# Patient Record
Sex: MALE | Race: BLACK OR AFRICAN AMERICAN
[De-identification: names, ages, dates, MRNs, and addresses within clinical notes are randomized per-mention and may not be internally consistent; named-entity substitution may affect disease eponyms.]

---

## 2021-09-22 ENCOUNTER — HOSPITAL ENCOUNTER (INPATIENT)
Dept: HOSPITAL 96 - M.ERS | Age: 56
LOS: 6 days | Discharge: HOME | DRG: 177 | End: 2021-09-28
Attending: STUDENT IN AN ORGANIZED HEALTH CARE EDUCATION/TRAINING PROGRAM | Admitting: STUDENT IN AN ORGANIZED HEALTH CARE EDUCATION/TRAINING PROGRAM
Payer: OTHER GOVERNMENT

## 2021-09-22 VITALS — WEIGHT: 315 LBS | BODY MASS INDEX: 42.66 KG/M2 | HEIGHT: 72.01 IN

## 2021-09-22 VITALS — DIASTOLIC BLOOD PRESSURE: 63 MMHG | SYSTOLIC BLOOD PRESSURE: 103 MMHG

## 2021-09-22 DIAGNOSIS — I26.99: ICD-10-CM

## 2021-09-22 DIAGNOSIS — J12.82: ICD-10-CM

## 2021-09-22 DIAGNOSIS — Z79.82: ICD-10-CM

## 2021-09-22 DIAGNOSIS — E78.5: ICD-10-CM

## 2021-09-22 DIAGNOSIS — Z88.8: ICD-10-CM

## 2021-09-22 DIAGNOSIS — E11.65: ICD-10-CM

## 2021-09-22 DIAGNOSIS — I21.A1: ICD-10-CM

## 2021-09-22 DIAGNOSIS — J96.01: ICD-10-CM

## 2021-09-22 DIAGNOSIS — I10: ICD-10-CM
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
## 2021-09-22 DIAGNOSIS — N30.00: ICD-10-CM

## 2021-09-22 DIAGNOSIS — U07.1: Primary | ICD-10-CM

## 2021-09-22 DIAGNOSIS — T38.0X5A: ICD-10-CM

## 2021-09-22 DIAGNOSIS — Z79.899: ICD-10-CM

## 2021-09-22 DIAGNOSIS — E66.01: ICD-10-CM

## 2021-09-22 LAB
ABSOLUTE BASOPHILS: 0.1 THOU/UL (ref 0–0.2)
ABSOLUTE EOSINOPHILS: 0.1 THOU/UL (ref 0–0.7)
ABSOLUTE MONOCYTES: 0.8 THOU/UL (ref 0–1.2)
ALBUMIN SERPL-MCNC: 3 G/DL (ref 3.4–5)
ALP SERPL-CCNC: 96 U/L (ref 46–116)
ALT SERPL-CCNC: 119 U/L (ref 30–65)
ANION GAP SERPL CALC-SCNC: 5 MMOL/L (ref 7–16)
AST SERPL-CCNC: 53 U/L (ref 15–37)
BASOPHILS NFR BLD AUTO: 0.9 %
BE: 1.2 MMOL/L
BILIRUB SERPL-MCNC: 0.6 MG/DL
BUN SERPL-MCNC: 10 MG/DL (ref 7–18)
CALCIUM SERPL-MCNC: 9 MG/DL (ref 8.5–10.1)
CHLORIDE SERPL-SCNC: 102 MMOL/L (ref 98–107)
CO2 SERPL-SCNC: 31 MMOL/L (ref 21–32)
CREAT SERPL-MCNC: 1.3 MG/DL (ref 0.6–1.3)
EOSINOPHIL NFR BLD: 0.6 %
GLUCOSE SERPL-MCNC: 156 MG/DL (ref 70–99)
GRANULOCYTES NFR BLD MANUAL: 75 %
HCT VFR BLD CALC: 41.7 % (ref 42–52)
HGB BLD-MCNC: 13.7 GM/DL (ref 14–18)
LYMPHOCYTES # BLD: 1 THOU/UL (ref 0.8–5.3)
LYMPHOCYTES NFR BLD AUTO: 12.6 %
MAGNESIUM SERPL-MCNC: 2.2 MG/DL (ref 1.8–2.4)
MCH RBC QN AUTO: 27.4 PG (ref 26–34)
MCHC RBC AUTO-ENTMCNC: 33 G/DL (ref 28–37)
MCV RBC: 83 FL (ref 80–100)
MONOCYTES NFR BLD: 10.9 %
MPV: 7.4 FL. (ref 7.2–11.1)
NEUTROPHILS # BLD: 5.8 THOU/UL (ref 1.6–8.1)
NT-PRO BRAIN NAT PEPTIDE: 470 PG/ML (ref ?–300)
NUCLEATED RBCS: 0 /100WBC
PCO2 BLD: 41.2 MMHG (ref 35–45)
PLATELET COUNT*: 328 THOU/UL (ref 150–400)
PO2 BLD: 141.5 MMHG (ref 75–100)
POTASSIUM SERPL-SCNC: 4.5 MMOL/L (ref 3.5–5.1)
PROT SERPL-MCNC: 7.9 G/DL (ref 6.4–8.2)
RBC # BLD AUTO: 5.02 MIL/UL (ref 4.5–6)
RDW-CV: 14.8 % (ref 10.5–14.5)
SODIUM SERPL-SCNC: 138 MMOL/L (ref 136–145)
WBC # BLD AUTO: 7.7 THOU/UL (ref 4–11)

## 2021-09-23 VITALS — SYSTOLIC BLOOD PRESSURE: 121 MMHG | DIASTOLIC BLOOD PRESSURE: 93 MMHG

## 2021-09-23 VITALS — SYSTOLIC BLOOD PRESSURE: 108 MMHG | DIASTOLIC BLOOD PRESSURE: 75 MMHG

## 2021-09-23 VITALS — SYSTOLIC BLOOD PRESSURE: 119 MMHG | DIASTOLIC BLOOD PRESSURE: 78 MMHG

## 2021-09-23 VITALS — SYSTOLIC BLOOD PRESSURE: 91 MMHG | DIASTOLIC BLOOD PRESSURE: 64 MMHG

## 2021-09-23 VITALS — SYSTOLIC BLOOD PRESSURE: 114 MMHG | DIASTOLIC BLOOD PRESSURE: 81 MMHG

## 2021-09-23 VITALS — SYSTOLIC BLOOD PRESSURE: 113 MMHG | DIASTOLIC BLOOD PRESSURE: 62 MMHG

## 2021-09-23 LAB
BACTERIA-REFLEX: (no result) /HPF
BILIRUB UR-MCNC: NEGATIVE MG/DL
COLOR UR: YELLOW
KETONES UR STRIP-MCNC: NEGATIVE MG/DL
MUCUS: (no result) STRN/LPF
PROT UR QL STRIP: NEGATIVE
RBC # UR STRIP: NEGATIVE /UL
RBC #/AREA URNS HPF: (no result) /HPF (ref 0–2)
SP GR UR STRIP: 1.01 (ref 1–1.03)
SQUAMOUS: (no result) /LPF (ref 0–3)
TRANSITIONAL EPITHEL CELL: (no result) /LPF
URINE CLARITY: (no result)
URINE GLUCOSE-RANDOM: NEGATIVE
URINE LEUKOCYTES-REFLEX: (no result)
URINE NITRITE-REFLEX: NEGATIVE
UROBILINOGEN UR STRIP-ACNC: 0.2 E.U./DL (ref 0.2–1)

## 2021-09-23 NOTE — 2DMMODE
[place] [address]
Phone:  [phone] 2 D/M-MODE ECHOCARDIOGRAM     
_______________________________________________________________________________
 
Name:         RODOLFO WYATT                  Room:          [address] [place] 
[facility]#:    [id number]     Account #:     [id number]  
Admission:    21    Attend Phys:   Veronika Huitron
Discharge:                Date of Birth: [birth year]  
Date of Service: 21 1231  Report #:      [id number]
        [id number]
_______________________________________________________________________________
THIS REPORT FOR:
 
cc:  FAM - No family physician/PCP 
     FAM - No family physician/PCP 
     Best Howard MD [facility]     
                                                                       ~
 
--------------- APPROVED REPORT --------------
 
 
Study performed:  2021 10:11:45
 
EXAM: Comprehensive 2D, Doppler, and color-flow 
Echocardiogram 
Patient Location: In-Patient   
Room #:  [facility]     Status:  routine
 
     BSA:         3.13
HR: 96 bpm BP:          91/64 mmHg 
Rhythm: NSR    
 
Other Information 
Study Quality: Good
 
Indications
Elevated Troponin
 
2D Dimensions
IVSd:  11.42 (7-11mm) LVOT Diam:  21.66 (18-24mm) 
LVDd:  37.63 mm  
PWd:  11.74 (7-11mm) Ascending Ao:  38.53 (22-36mm)
LVDs:  21.46 (25-40mm) 
Aortic Root:  38.22 mm 
 
Volumes
Left Atrial Volume (Systole) 
    LA ESV Index:  16.50 mL/m2
 
Aortic Valve
AoV Peak Xavier.:  1.33 m/s 
AO Peak Gr.:  7.10 mmHg  LVOT Max PG:  3.01 mmHg
AO Mean Gr.:  3.80 mmHg  LVOT Mean P.57 mmHg
    LVOT Max V:  0.87 m/s
AO V2 VTI:  18.74 cm  LVOT Mean V:  0.59 m/s
SARAH (VTI):  2.66 cm2  LVOT V1 VTI:  13.53 cm
 
 
 
[place] [address]
Phone:  [phone]                     2 D/M-MODE ECHOCARDIOGRAM     
_______________________________________________________________________________
 
Name:         RODOLFO WYATT                  Room:          [address] [place] 
.R.#:    [id number]     Account #:     [id number]  
Admission:    21    Attend Phys:   Veronika Huitron
Discharge:                Date of Birth: [birth year]  
Date of Service: 21 1231  Report #:      [id number]
        [id number]
_______________________________________________________________________________
Mitral Valve
    E/A Ratio:  1.03
    MV Decel. Time:  232.06 ms
MV E Max Xavier.:  0.58 m/s 
MV PHT:  67.30 ms  
MVA (PHT):  3.27 cm2  
 
TDI
E/Lateral E':  6.44 E/Medial E':  5.27
   Medial E' Xavier.:  0.11 m/s
   Lateral E' Xavier.:  0.09 m/s
 
Pulmonary Valve
PV Peak Xavier.:  1.23 m/s PV Peak Gr.:  6.04 mmHg
 
Tricuspid Valve
    RAP Estimate:  5.00 mmHg
TR Peak Gr.:  40.70 mmHg RVSP:  45.00 mmHg
    PA Pressure:  45.00 mmHg
 
Left Ventricle
The left ventricle is normal size. There is normal LV segmental wall 
motion. Mild concentric left ventricular hypertrophy. Left 
ventricular systolic function is normal. LVEF is 60-65%. The left 
ventricular diastolic function is normal.
 
Right Ventricle
Right ventricle is moderately dilated. The right ventricular systolic 
function is normal.
 
Atria
The left atrium size is normal. Right atrium is moderately 
dilated.
 
Aortic Valve
The aortic valve is normal in structure. No aortic regurgitation is 
present. There is no aortic valvular stenosis.
 
Mitral Valve
The mitral valve is normal in structure. There is no mitral valve 
regurgitation noted. No evidence of mitral valve stenosis.
 
Tricuspid Valve
The tricuspid valve is normal in structure. Mild tricuspid 
regurgitation. Moderate pulmonary hypertension. The RVSP is 45-50 
mmHg.
 
 
[place] [address]
Phone:  [phone]                     2 D/M-MODE ECHOCARDIOGRAM     
_______________________________________________________________________________
 
Name:         RODOLFO WYATT                  Room:          [address] [place] 
[facility]#:    [id number]     Account #:     [id number]  
Admission:    21    Attend Phys:   Veronika Huitron
Discharge:                Date of Birth: [birth year]  
Date of Service: 21 1231  Report #:      [id number]
        [id number]
_______________________________________________________________________________
 
Pulmonic Valve
The pulmonary valve is normal in structure. There is no pulmonic 
valvular regurgitation.
 
Great Vessels
The aortic root is normal in size. IVC is normal in size and 
collapses >50% with inspiration.
 
Pericardium
There is no pericardial effusion.
 
<Conclusion>
The left ventricle is normal size.
Mild concentric left ventricular hypertrophy.
Left ventricular systolic function is normal.
LVEF is 60-65%.
The left ventricular diastolic function is normal.
There is normal LV segmental wall motion.
Right ventricle is moderately dilated.
Right atrium is moderately dilated.
Mild tricuspid regurgitation.
Moderate pulmonary hypertension.
The RVSP is 45-50 mmHg.
 
 
 
 
 
 
 
 
 
 
 
 
 
 
 
 
 
 
 
 
  <ELECTRONICALLY SIGNED>
                                           By: Best Howard MD, FACC   
  21     1231
D: 21 1231   _____________________________________
T: 21 1231   Best Howard MD, FACC     /INF

## 2021-09-23 NOTE — EKG
[place] [address]
Phone:  [phone]                     ELECTROCARDIOGRAM REPORT      
_______________________________________________________________________________
 
Name:         TRICIA WYATTNEY                  Room:          [address]    ADM [place] 
..#:    [id number]     Account #:     [id number]  
Admission:    21    Attend Phys:   Veronika Huitron
Discharge:                Date of Birth: [birth year]  
Date of Service: 21  Report #:      [id number]
        [id number]
_______________________________________________________________________________
THIS REPORT FOR:  //name//                      
 
                         [facility] ED
                                       
Test Date:    2021               Test Time:    21:10:59
Pat Name:     RODOLFO WYATT             Department:   
Patient ID:   [id number]            Room:         [facility]
Gender:       M                        Technician:   [place]
:          1965               Requested By: Delfina Campos
Order Number: [id number] MD:   Shahriar Dupont
                                 Measurements
Intervals                              Axis          
Rate:         113                      P:            64
KY:           179                      QRS:          58
QRSD:         101                      T:            4
QT:           354                                    
QTc:          486                                    
                           Interpretive Statements
Sinus tachycardia
Low voltage, precordial leads
Borderline T wave abnormalities
Borderline prolonged QT interval
No previous ECG available for comparison
Electronically Signed On 2021 12:38:26 CDT by Shahriar Dupont
[email or web address]
 
 
 
 
 
 
 
 
 
 
 
 
 
 
 
 
 
 
 
  <ELECTRONICALLY SIGNED>
                                           By: Shahriar Dupont MD, [facility]     
  21     1238
D: 21   _____________________________________
T: 21   Shahriar Dupont MD, [facility]       /EPI

## 2021-09-24 VITALS — DIASTOLIC BLOOD PRESSURE: 59 MMHG | SYSTOLIC BLOOD PRESSURE: 101 MMHG

## 2021-09-24 VITALS — SYSTOLIC BLOOD PRESSURE: 104 MMHG | DIASTOLIC BLOOD PRESSURE: 70 MMHG

## 2021-09-24 VITALS — SYSTOLIC BLOOD PRESSURE: 109 MMHG | DIASTOLIC BLOOD PRESSURE: 73 MMHG

## 2021-09-24 VITALS — DIASTOLIC BLOOD PRESSURE: 81 MMHG | SYSTOLIC BLOOD PRESSURE: 114 MMHG

## 2021-09-24 VITALS — DIASTOLIC BLOOD PRESSURE: 64 MMHG | SYSTOLIC BLOOD PRESSURE: 104 MMHG

## 2021-09-24 LAB
ANION GAP SERPL CALC-SCNC: 8 MMOL/L (ref 7–16)
BUN SERPL-MCNC: 16 MG/DL (ref 7–18)
CALCIUM SERPL-MCNC: 8.8 MG/DL (ref 8.5–10.1)
CHLORIDE SERPL-SCNC: 105 MMOL/L (ref 98–107)
CO2 SERPL-SCNC: 27 MMOL/L (ref 21–32)
CREAT SERPL-MCNC: 1.4 MG/DL (ref 0.6–1.3)
EST. AVERAGE GLUCOSE BLD GHB EST-MCNC: 157 MG/DL
GLUCOSE SERPL-MCNC: 142 MG/DL (ref 70–99)
GLYCOHEMOGLOBIN (HGB A1C): 7.1 % (ref 4.8–5.6)
HCT VFR BLD CALC: 39.8 % (ref 42–52)
HGB BLD-MCNC: 12.8 GM/DL (ref 14–18)
MCH RBC QN AUTO: 26.7 PG (ref 26–34)
MCHC RBC AUTO-ENTMCNC: 32.1 G/DL (ref 28–37)
MCV RBC: 83.3 FL (ref 80–100)
MPV: 7.3 FL. (ref 7.2–11.1)
PLATELET COUNT*: 317 THOU/UL (ref 150–400)
POTASSIUM SERPL-SCNC: 4.8 MMOL/L (ref 3.5–5.1)
RBC # BLD AUTO: 4.78 MIL/UL (ref 4.5–6)
RDW-CV: 15.1 % (ref 10.5–14.5)
SODIUM SERPL-SCNC: 140 MMOL/L (ref 136–145)
WBC # BLD AUTO: 10.3 THOU/UL (ref 4–11)

## 2021-09-24 NOTE — EKG
[place] [address]
Phone:  [phone]                     ELECTROCARDIOGRAM REPORT      
_______________________________________________________________________________
 
Name:         TRICIA WYATTNEY                  Room:          [address]    [place] 
..#:    [id number]     Account #:     [id number]  
Admission:    21    Attend Phys:   Veronika Huitron
Discharge:                Date of Birth: [birth year]  
Date of Service: 21 1235  Report #:      [id number]
        [id number]
_______________________________________________________________________________
THIS REPORT FOR:  //name//                      
 
                          [facility]
                                       
Test Date:    2021               Test Time:    12:35:49
Pat Name:     RODOLFO WYATT             Department:   
Patient ID:   [id number]            Room:         [address]
Gender:       M                        Technician:   ALFONSO
:          1965               Requested By: Robyn Mattson
Order Number: [id number]    Ritu MD:   Shahriar Dupont
                                 Measurements
Intervals                              Axis          
Rate:         93                       P:            69
WV:           180                      QRS:          52
QRSD:         90                       T:            31
QT:           366                                    
QTc:          456                                    
                           Interpretive Statements
Sinus rhythm
Anteroseptal infarct, age indeterminate possible
Compared to ECG 2021 21:10:59
Myocardial infarct finding now present
Sinus tachycardia no longer present
T-wave abnormality no longer present
Electronically Signed On 2021 9:34:52 CDT by Shahriar Dupont
[email or web address]
 
 
 
 
 
 
 
 
 
 
 
 
 
 
 
 
 
 
  <ELECTRONICALLY SIGNED>
                                           By: Shahriar Dupont MD, FAC     
  21     0934
D: 21 1235   _____________________________________
T: 21 1235   Shahriar Dupont MD, FAC       /EPI

## 2021-09-25 VITALS — SYSTOLIC BLOOD PRESSURE: 113 MMHG | DIASTOLIC BLOOD PRESSURE: 68 MMHG

## 2021-09-25 VITALS — DIASTOLIC BLOOD PRESSURE: 69 MMHG | SYSTOLIC BLOOD PRESSURE: 102 MMHG

## 2021-09-25 VITALS — DIASTOLIC BLOOD PRESSURE: 62 MMHG | SYSTOLIC BLOOD PRESSURE: 101 MMHG

## 2021-09-25 VITALS — SYSTOLIC BLOOD PRESSURE: 95 MMHG | DIASTOLIC BLOOD PRESSURE: 65 MMHG

## 2021-09-25 VITALS — SYSTOLIC BLOOD PRESSURE: 90 MMHG | DIASTOLIC BLOOD PRESSURE: 49 MMHG

## 2021-09-25 VITALS — SYSTOLIC BLOOD PRESSURE: 107 MMHG | DIASTOLIC BLOOD PRESSURE: 63 MMHG

## 2021-09-25 LAB
ANION GAP SERPL CALC-SCNC: 6 MMOL/L (ref 7–16)
BE: 3.2 MMOL/L
BUN SERPL-MCNC: 17 MG/DL (ref 7–18)
CALCIUM SERPL-MCNC: 8.5 MG/DL (ref 8.5–10.1)
CHLORIDE SERPL-SCNC: 104 MMOL/L (ref 98–107)
CO2 SERPL-SCNC: 30 MMOL/L (ref 21–32)
CREAT SERPL-MCNC: 1.3 MG/DL (ref 0.6–1.3)
GLUCOSE SERPL-MCNC: 137 MG/DL (ref 70–99)
HCT VFR BLD CALC: 37.6 % (ref 42–52)
HGB BLD-MCNC: 12.2 GM/DL (ref 14–18)
MCH RBC QN AUTO: 27.3 PG (ref 26–34)
MCHC RBC AUTO-ENTMCNC: 32.4 G/DL (ref 28–37)
MCV RBC: 84.1 FL (ref 80–100)
MPV: 7.4 FL. (ref 7.2–11.1)
PCO2 BLD: 45.6 MMHG (ref 35–45)
PLATELET COUNT*: 278 THOU/UL (ref 150–400)
PO2 BLD: 98.8 MMHG (ref 75–100)
POTASSIUM SERPL-SCNC: 4.8 MMOL/L (ref 3.5–5.1)
RBC # BLD AUTO: 4.47 MIL/UL (ref 4.5–6)
RDW-CV: 15.3 % (ref 10.5–14.5)
SODIUM SERPL-SCNC: 140 MMOL/L (ref 136–145)
WBC # BLD AUTO: 9.1 THOU/UL (ref 4–11)

## 2021-09-26 VITALS — DIASTOLIC BLOOD PRESSURE: 73 MMHG | SYSTOLIC BLOOD PRESSURE: 131 MMHG

## 2021-09-26 VITALS — DIASTOLIC BLOOD PRESSURE: 73 MMHG | SYSTOLIC BLOOD PRESSURE: 114 MMHG

## 2021-09-26 VITALS — SYSTOLIC BLOOD PRESSURE: 120 MMHG | DIASTOLIC BLOOD PRESSURE: 54 MMHG

## 2021-09-26 VITALS — SYSTOLIC BLOOD PRESSURE: 113 MMHG | DIASTOLIC BLOOD PRESSURE: 70 MMHG

## 2021-09-26 VITALS — SYSTOLIC BLOOD PRESSURE: 108 MMHG | DIASTOLIC BLOOD PRESSURE: 57 MMHG

## 2021-09-26 VITALS — DIASTOLIC BLOOD PRESSURE: 76 MMHG | SYSTOLIC BLOOD PRESSURE: 105 MMHG

## 2021-09-26 LAB
ALBUMIN SERPL-MCNC: 2.5 G/DL (ref 3.4–5)
ALP SERPL-CCNC: 74 U/L (ref 46–116)
ALT SERPL-CCNC: 111 U/L (ref 30–65)
ANION GAP SERPL CALC-SCNC: 6 MMOL/L (ref 7–16)
AST SERPL-CCNC: 33 U/L (ref 15–37)
BILIRUB SERPL-MCNC: 0.3 MG/DL
BUN SERPL-MCNC: 17 MG/DL (ref 7–18)
CALCIUM SERPL-MCNC: 8.3 MG/DL (ref 8.5–10.1)
CHLORIDE SERPL-SCNC: 102 MMOL/L (ref 98–107)
CO2 SERPL-SCNC: 30 MMOL/L (ref 21–32)
CREAT SERPL-MCNC: 1.1 MG/DL (ref 0.6–1.3)
GLUCOSE SERPL-MCNC: 149 MG/DL (ref 70–99)
HCT VFR BLD CALC: 39.8 % (ref 42–52)
HGB BLD-MCNC: 12.8 GM/DL (ref 14–18)
MAGNESIUM SERPL-MCNC: 2 MG/DL (ref 1.8–2.4)
MCH RBC QN AUTO: 26.8 PG (ref 26–34)
MCHC RBC AUTO-ENTMCNC: 32 G/DL (ref 28–37)
MCV RBC: 83.8 FL (ref 80–100)
MPV: 7.3 FL. (ref 7.2–11.1)
PLATELET COUNT*: 273 THOU/UL (ref 150–400)
POTASSIUM SERPL-SCNC: 4.4 MMOL/L (ref 3.5–5.1)
PROT SERPL-MCNC: 6.7 G/DL (ref 6.4–8.2)
RBC # BLD AUTO: 4.75 MIL/UL (ref 4.5–6)
RDW-CV: 15.1 % (ref 10.5–14.5)
SODIUM SERPL-SCNC: 138 MMOL/L (ref 136–145)
WBC # BLD AUTO: 8.5 THOU/UL (ref 4–11)

## 2021-09-27 VITALS — DIASTOLIC BLOOD PRESSURE: 77 MMHG | SYSTOLIC BLOOD PRESSURE: 142 MMHG

## 2021-09-27 VITALS — SYSTOLIC BLOOD PRESSURE: 117 MMHG | DIASTOLIC BLOOD PRESSURE: 51 MMHG

## 2021-09-27 VITALS — SYSTOLIC BLOOD PRESSURE: 112 MMHG | DIASTOLIC BLOOD PRESSURE: 64 MMHG

## 2021-09-27 VITALS — SYSTOLIC BLOOD PRESSURE: 114 MMHG | DIASTOLIC BLOOD PRESSURE: 68 MMHG

## 2021-09-27 VITALS — SYSTOLIC BLOOD PRESSURE: 120 MMHG | DIASTOLIC BLOOD PRESSURE: 75 MMHG

## 2021-09-27 VITALS — SYSTOLIC BLOOD PRESSURE: 106 MMHG | DIASTOLIC BLOOD PRESSURE: 62 MMHG

## 2021-09-27 LAB
ALBUMIN SERPL-MCNC: 2.4 G/DL (ref 3.4–5)
ALP SERPL-CCNC: 72 U/L (ref 46–116)
ALT SERPL-CCNC: 123 U/L (ref 30–65)
ANION GAP SERPL CALC-SCNC: 4 MMOL/L (ref 7–16)
AST SERPL-CCNC: 33 U/L (ref 15–37)
BILIRUB SERPL-MCNC: 0.4 MG/DL
BUN SERPL-MCNC: 19 MG/DL (ref 7–18)
CALCIUM SERPL-MCNC: 8.1 MG/DL (ref 8.5–10.1)
CHLORIDE SERPL-SCNC: 105 MMOL/L (ref 98–107)
CO2 SERPL-SCNC: 31 MMOL/L (ref 21–32)
CREAT SERPL-MCNC: 1 MG/DL (ref 0.6–1.3)
GLUCOSE SERPL-MCNC: 158 MG/DL (ref 70–99)
HCT VFR BLD CALC: 38.9 % (ref 42–52)
HGB BLD-MCNC: 12.4 GM/DL (ref 14–18)
MAGNESIUM SERPL-MCNC: 2 MG/DL (ref 1.8–2.4)
MCH RBC QN AUTO: 26.8 PG (ref 26–34)
MCHC RBC AUTO-ENTMCNC: 31.9 G/DL (ref 28–37)
MCV RBC: 84.2 FL (ref 80–100)
MPV: 7.8 FL. (ref 7.2–11.1)
PLATELET COUNT*: 279 THOU/UL (ref 150–400)
POTASSIUM SERPL-SCNC: 4.5 MMOL/L (ref 3.5–5.1)
PROT SERPL-MCNC: 6.4 G/DL (ref 6.4–8.2)
RBC # BLD AUTO: 4.62 MIL/UL (ref 4.5–6)
RDW-CV: 14.7 % (ref 10.5–14.5)
SODIUM SERPL-SCNC: 140 MMOL/L (ref 136–145)
WBC # BLD AUTO: 8.3 THOU/UL (ref 4–11)

## 2021-09-28 VITALS — DIASTOLIC BLOOD PRESSURE: 71 MMHG | SYSTOLIC BLOOD PRESSURE: 114 MMHG

## 2021-09-28 VITALS — SYSTOLIC BLOOD PRESSURE: 117 MMHG | DIASTOLIC BLOOD PRESSURE: 73 MMHG

## 2021-09-28 VITALS — DIASTOLIC BLOOD PRESSURE: 70 MMHG | SYSTOLIC BLOOD PRESSURE: 110 MMHG

## 2021-09-28 VITALS — DIASTOLIC BLOOD PRESSURE: 69 MMHG | SYSTOLIC BLOOD PRESSURE: 111 MMHG

## 2021-09-28 LAB
ALBUMIN SERPL-MCNC: 2.5 G/DL (ref 3.4–5)
ALP SERPL-CCNC: 72 U/L (ref 46–116)
ALT SERPL-CCNC: 120 U/L (ref 30–65)
ANION GAP SERPL CALC-SCNC: < 0 MMOL/L (ref 7–16)
AST SERPL-CCNC: 28 U/L (ref 15–37)
BILIRUB SERPL-MCNC: 0.4 MG/DL
BUN SERPL-MCNC: 18 MG/DL (ref 7–18)
CALCIUM SERPL-MCNC: 8 MG/DL (ref 8.5–10.1)
CHLORIDE SERPL-SCNC: 104 MMOL/L (ref 98–107)
CO2 SERPL-SCNC: 32 MMOL/L (ref 21–32)
CREAT SERPL-MCNC: 1 MG/DL (ref 0.6–1.3)
GLUCOSE SERPL-MCNC: 170 MG/DL (ref 70–99)
HCT VFR BLD CALC: 39 % (ref 42–52)
HGB BLD-MCNC: 12.7 GM/DL (ref 14–18)
MAGNESIUM SERPL-MCNC: 2 MG/DL (ref 1.8–2.4)
MCH RBC QN AUTO: 27.4 PG (ref 26–34)
MCHC RBC AUTO-ENTMCNC: 32.7 G/DL (ref 28–37)
MCV RBC: 83.8 FL (ref 80–100)
MPV: 7.7 FL. (ref 7.2–11.1)
PLATELET COUNT*: 263 THOU/UL (ref 150–400)
POTASSIUM SERPL-SCNC: 4.4 MMOL/L (ref 3.5–5.1)
PROT SERPL-MCNC: 6.4 G/DL (ref 6.4–8.2)
RBC # BLD AUTO: 4.65 MIL/UL (ref 4.5–6)
RDW-CV: 14.9 % (ref 10.5–14.5)
SODIUM SERPL-SCNC: 135 MMOL/L (ref 136–145)
WBC # BLD AUTO: 8.1 THOU/UL (ref 4–11)